# Patient Record
Sex: MALE
[De-identification: names, ages, dates, MRNs, and addresses within clinical notes are randomized per-mention and may not be internally consistent; named-entity substitution may affect disease eponyms.]

---
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
## 2025-08-29 ENCOUNTER — HOSPITAL ENCOUNTER (OUTPATIENT)
Age: 20
Discharge: HOME | End: 2025-08-29

## 2025-08-29 ENCOUNTER — APPOINTMENT (OUTPATIENT)
Dept: GENERAL RADIOLOGY | Age: 20
End: 2025-08-29
Attending: Physician Assistant

## 2025-08-29 VITALS
OXYGEN SATURATION: 99 % | DIASTOLIC BLOOD PRESSURE: 65 MMHG | TEMPERATURE: 98 F | SYSTOLIC BLOOD PRESSURE: 108 MMHG | HEART RATE: 60 BPM | RESPIRATION RATE: 18 BRPM

## 2025-08-29 DIAGNOSIS — S62.339A CLOSED BOXER'S FRACTURE, INITIAL ENCOUNTER: Primary | ICD-10-CM

## 2025-08-29 PROCEDURE — 99203 OFFICE O/P NEW LOW 30 MIN: CPT | Performed by: PHYSICIAN ASSISTANT

## 2025-08-29 PROCEDURE — 29125 APPL SHORT ARM SPLINT STATIC: CPT | Performed by: PHYSICIAN ASSISTANT

## 2025-08-29 PROCEDURE — A4565 SLINGS: HCPCS | Performed by: PHYSICIAN ASSISTANT

## 2025-08-29 PROCEDURE — 73130 X-RAY EXAM OF HAND: CPT | Performed by: PHYSICIAN ASSISTANT

## (undated) NOTE — LETTER
VACCINE ADMINISTRATION RECORD  PARENT / GUARDIAN APPROVAL  Date: 2021  Vaccine administered to: Dayana Viveros     : 2005    MRN: [id number]    A copy of the appropriate Centers for Disease Control and Prevention Vaccine Information statement h

## (undated) NOTE — LETTER
2/21/2022          To Whom It May Concern:    Fozia Flannery is currently under my medical care and may return to school at this time. He may return to school on 02/21/22. Activity is restricted as follows: no phys ed may return to [facility] 02/22/22. If you require additional information please contact our office.         Sincerely,      Escobar Caraballo MD          Document generated by:  Escobar Caraballo MD

## (undated) NOTE — LETTER
[address] [employer] of Child Health Examination       Student's Name  Robert Gomez D Title                           Date    (If adding dates to the above immunization history section, put your initials by no known allergies. MEDICATION  (List all prescribed or taken on a regular basis.)  No current outpatient medications on file. Diagnosis of asthma? Child wakes during the night coughing   Yes   No    Yes   No    Loss of function of one of paired organs? History No    Ethnic Minority  No          Signs of Insulin Resistance (hypertension, dyslipidemia, polycystic ovarian syndrome, acanthosis nigricans)    No           At Risk  No   Lead Risk Questionnaire  Req'd for children 6 months thru 6 yrs enrolled in corticosteroid):   No Other   NEEDS/MODIFICATIONS required in the school setting  None DIETARY Needs/Restrictions     None   SPECIAL INSTRUCTIONS/DEVICES e.g. safety glasses, glass eye, chest protector for arrhythmia, pacemaker, prosthetic device, dental b

## (undated) NOTE — LETTER
2/22/2022          To Whom It May Concern:    Gerard Boyd is currently under my medical care and may return to school at this time. He may return to school. Activity is restricted as follows: none. May return to gym/sports. If you require additional information please contact our office.         Sincerely,      Tim Rosales MD          Document generated by:  Tim Rosales MD

## (undated) NOTE — LETTER
1/25/2022          To Whom It May Concern:    Prakash Soriano is currently under my medical care and may return to school at this time. He may return to school on 01/26/22. Diagnosed with a rib fracture.    Activity is restricted as follows: no phys ed n